# Patient Record
Sex: FEMALE | Race: WHITE
[De-identification: names, ages, dates, MRNs, and addresses within clinical notes are randomized per-mention and may not be internally consistent; named-entity substitution may affect disease eponyms.]

---

## 2021-03-05 ENCOUNTER — HOSPITAL ENCOUNTER (INPATIENT)
Dept: HOSPITAL 35 - ER | Age: 66
LOS: 2 days | Discharge: HOME | DRG: 694 | End: 2021-03-07
Attending: INTERNAL MEDICINE | Admitting: INTERNAL MEDICINE
Payer: COMMERCIAL

## 2021-03-05 VITALS — DIASTOLIC BLOOD PRESSURE: 97 MMHG | SYSTOLIC BLOOD PRESSURE: 147 MMHG

## 2021-03-05 VITALS — DIASTOLIC BLOOD PRESSURE: 97 MMHG | SYSTOLIC BLOOD PRESSURE: 153 MMHG

## 2021-03-05 VITALS — SYSTOLIC BLOOD PRESSURE: 153 MMHG | DIASTOLIC BLOOD PRESSURE: 97 MMHG

## 2021-03-05 VITALS — DIASTOLIC BLOOD PRESSURE: 83 MMHG | SYSTOLIC BLOOD PRESSURE: 137 MMHG

## 2021-03-05 VITALS — HEIGHT: 67 IN | WEIGHT: 125 LBS | BODY MASS INDEX: 19.62 KG/M2

## 2021-03-05 VITALS — SYSTOLIC BLOOD PRESSURE: 172 MMHG | DIASTOLIC BLOOD PRESSURE: 103 MMHG

## 2021-03-05 VITALS — SYSTOLIC BLOOD PRESSURE: 137 MMHG | DIASTOLIC BLOOD PRESSURE: 83 MMHG

## 2021-03-05 VITALS — DIASTOLIC BLOOD PRESSURE: 91 MMHG | SYSTOLIC BLOOD PRESSURE: 151 MMHG

## 2021-03-05 DIAGNOSIS — Z20.822: ICD-10-CM

## 2021-03-05 DIAGNOSIS — Z86.16: ICD-10-CM

## 2021-03-05 DIAGNOSIS — N30.80: ICD-10-CM

## 2021-03-05 DIAGNOSIS — N20.0: ICD-10-CM

## 2021-03-05 DIAGNOSIS — N13.0: Primary | ICD-10-CM

## 2021-03-05 DIAGNOSIS — D64.9: ICD-10-CM

## 2021-03-05 DIAGNOSIS — I10: ICD-10-CM

## 2021-03-05 DIAGNOSIS — Z28.21: ICD-10-CM

## 2021-03-05 DIAGNOSIS — E44.0: ICD-10-CM

## 2021-03-05 LAB
ALBUMIN SERPL-MCNC: 2.8 G/DL (ref 3.4–5)
ALT SERPL-CCNC: 10 U/L (ref 30–65)
AMYLASE SERPL-CCNC: 54 U/L (ref 25–115)
ANION GAP SERPL CALC-SCNC: 8 MMOL/L (ref 7–16)
AST SERPL-CCNC: 9 U/L (ref 15–37)
BASOPHILS NFR BLD AUTO: 0.5 % (ref 0–2)
BILIRUB DIRECT SERPL-MCNC: < 0.1 MG/DL
BILIRUB SERPL-MCNC: 0.3 MG/DL (ref 0.2–1)
BILIRUB UR-MCNC: NEGATIVE MG/DL
BUN SERPL-MCNC: 11 MG/DL (ref 7–18)
CALCIUM SERPL-MCNC: 9.3 MG/DL (ref 8.5–10.1)
CHLORIDE SERPL-SCNC: 102 MMOL/L (ref 98–107)
CO2 SERPL-SCNC: 28 MMOL/L (ref 21–32)
COLOR UR: YELLOW
CREAT SERPL-MCNC: 1 MG/DL (ref 0.6–1)
EOSINOPHIL NFR BLD: 0.6 % (ref 0–3)
ERYTHROCYTE [DISTWIDTH] IN BLOOD BY AUTOMATED COUNT: 14.9 % (ref 10.5–14.5)
FERRITIN SERPL-MCNC: 220 NG/ML (ref 8–252)
FOLATE SERPL-MCNC: 7 NG/ML (ref 8.6–58.9)
GLUCOSE SERPL-MCNC: 105 MG/DL (ref 74–106)
GRANULOCYTES NFR BLD MANUAL: 75.8 % (ref 36–66)
HCT VFR BLD CALC: 31.4 % (ref 37–47)
HGB BLD-MCNC: 10.2 GM/DL (ref 12–15)
IRON SERPL-MCNC: 21 UG/DL (ref 50–170)
KETONES UR STRIP-MCNC: NEGATIVE MG/DL
LIPASE: 86 U/L (ref 73–393)
LYMPHOCYTES NFR BLD AUTO: 14.9 % (ref 24–44)
MCH RBC QN AUTO: 25.9 PG (ref 26–34)
MCHC RBC AUTO-ENTMCNC: 32.4 G/DL (ref 28–37)
MCV RBC: 79.9 FL (ref 80–100)
MONOCYTES NFR BLD: 8.2 % (ref 1–8)
NEUTROPHILS # BLD: 6.3 THOU/UL (ref 1.4–8.2)
PLATELET # BLD: 601 THOU/UL (ref 150–400)
POTASSIUM SERPL-SCNC: 3.6 MMOL/L (ref 3.5–5.1)
PROT SERPL-MCNC: 7.5 G/DL (ref 6.4–8.2)
RBC # BLD AUTO: 3.93 MIL/UL (ref 4.2–5)
RBC # UR STRIP: (no result) /UL
SAO2 % BLD FROM PO2: 12 % (ref 20–39)
SODIUM SERPL-SCNC: 138 MMOL/L (ref 136–145)
SP GR UR STRIP: 1.02 (ref 1–1.03)
TIBC SERPL-MCNC: 177 UG/DL (ref 250–450)
TROPONIN I SERPL-MCNC: <0.06 NG/ML (ref ?–0.06)
URINE CLARITY: CLEAR
URINE GLUCOSE-RANDOM*: NEGATIVE
URINE LEUKOCYTES-REFLEX: (no result)
URINE NITRITE-REFLEX: NEGATIVE
URINE PROTEIN (DIPSTICK): NEGATIVE
UROBILINOGEN UR STRIP-ACNC: 1 E.U./DL (ref 0.2–1)
VIT B12 SERPL-MCNC: 560 PG/ML (ref 193–986)
WBC # BLD AUTO: 8.4 THOU/UL (ref 4–11)

## 2021-03-05 PROCEDURE — 10779: CPT

## 2021-03-05 NOTE — NUR
Pt. transfered to room [address] on [address].  Pt. pleasant and offers no c/o pain or
nausea.  Report given to Geeta DELUNA.

## 2021-03-05 NOTE — NUR
ATTEMPTED TO CALL REPORT. WAITED ON HOLD FOR SEVERAL MINUTES. ADVISED GABY DORMAN
TO TAKE PT. TO FLOOR, NURSE CAN CALL FOR REPORT AT HIS/HER CONVENIENCE

## 2021-03-05 NOTE — NUR
Lab had called with covid positive lab results.  Dr. Husain called and notified
and she wants patient transfered to [facility].  House supervisor informed and
awaiting room number.  Pt. was informed of lab results and the need to
transfer to [facility].

## 2021-03-05 NOTE — NUR
Assumed pt care from ED. Pt is alert & oriented x4 and cooperative
. Up & ad alex. No c/o of
pain, nausea and vomiting this pm. Pt has IV L AC. Denies smoke and drinking.
Endorse to night nurse. Pt is on the bed. Bed on the lowest position, side
rails up x 2, call light within reach. Will continue to monitor. Follow POC.

## 2021-03-06 VITALS — DIASTOLIC BLOOD PRESSURE: 112 MMHG | SYSTOLIC BLOOD PRESSURE: 165 MMHG

## 2021-03-06 VITALS — DIASTOLIC BLOOD PRESSURE: 103 MMHG | SYSTOLIC BLOOD PRESSURE: 162 MMHG

## 2021-03-06 VITALS — SYSTOLIC BLOOD PRESSURE: 166 MMHG | DIASTOLIC BLOOD PRESSURE: 115 MMHG

## 2021-03-06 VITALS — SYSTOLIC BLOOD PRESSURE: 150 MMHG | DIASTOLIC BLOOD PRESSURE: 109 MMHG

## 2021-03-06 VITALS — DIASTOLIC BLOOD PRESSURE: 104 MMHG | SYSTOLIC BLOOD PRESSURE: 151 MMHG

## 2021-03-06 LAB
ANION GAP SERPL CALC-SCNC: 9 MMOL/L (ref 7–16)
BASOPHILS NFR BLD AUTO: 0.5 % (ref 0–2)
BUN SERPL-MCNC: 7 MG/DL (ref 7–18)
CALCIUM SERPL-MCNC: 8.7 MG/DL (ref 8.5–10.1)
CHLORIDE SERPL-SCNC: 103 MMOL/L (ref 98–107)
CO2 SERPL-SCNC: 25 MMOL/L (ref 21–32)
CREAT SERPL-MCNC: 0.9 MG/DL (ref 0.6–1)
EOSINOPHIL NFR BLD: 1 % (ref 0–3)
ERYTHROCYTE [DISTWIDTH] IN BLOOD BY AUTOMATED COUNT: 14.7 % (ref 10.5–14.5)
GLUCOSE SERPL-MCNC: 109 MG/DL (ref 74–106)
GRANULOCYTES NFR BLD MANUAL: 78.3 % (ref 36–66)
HCT VFR BLD CALC: 29.4 % (ref 37–47)
HGB BLD-MCNC: 9.5 GM/DL (ref 12–15)
LYMPHOCYTES NFR BLD AUTO: 13.5 % (ref 24–44)
MAGNESIUM SERPL-MCNC: 1.7 MG/DL (ref 1.8–2.4)
MCH RBC QN AUTO: 25.8 PG (ref 26–34)
MCHC RBC AUTO-ENTMCNC: 32.4 G/DL (ref 28–37)
MCV RBC: 79.6 FL (ref 80–100)
MONOCYTES NFR BLD: 6.7 % (ref 1–8)
NEUTROPHILS # BLD: 6.2 THOU/UL (ref 1.4–8.2)
PLATELET # BLD: 535 THOU/UL (ref 150–400)
POTASSIUM SERPL-SCNC: 3.6 MMOL/L (ref 3.5–5.1)
RBC # BLD AUTO: 3.7 MIL/UL (ref 4.2–5)
SODIUM SERPL-SCNC: 137 MMOL/L (ref 136–145)
WBC # BLD AUTO: 7.9 THOU/UL (ref 4–11)

## 2021-03-06 NOTE — NUR
PT AWAKE RESTING IN BED WATCHING TV. IVF INTACT. PT INDEP STEADY GAIT. BP
REMAINS ELEVATED R/T RENAL OBSTRUCTION. PT STATED SHE WANTS TO LEAVE IN THE AM
AND RETURN FOR KIDNEY SCAN OUTPT. PT DECLINED REPEAT COVID SWAB ON DAY SHIFT.
PT DECLINING MEDICATIONS OTHER THAN IVF. PT DECLINED HS SNACK.

## 2021-03-06 NOTE — NUR
pt transferred to room 356. covid positive. pt is aware of NPO status tonight
for nuclear medicaine study in the am. denies abdominal pain at time of
transfer. she stated that she really has not eaten at all today and her pain
is much improved. oriented to room and surroundings. calm and cooperative.

## 2021-03-06 NOTE — NUR
PATIENT HAS RESTED IN ROOM THROUGH THE DAY. SHE STATES SHE WILL LIKE TO GO
HOME BUT  WILL PREFER SHE STAYED THROUGH THE NIGHT TO GET A REPEATE OF RENAL
TESTS IN AM. SHE IS PLEASANT. REFUSED TO HAVE SECOND COVID TEST DONE AS SHE
THINKS WILL NOT CHANGE HER OUTCOME.  AND FMILY HAS BEEN EXPOSED TO
COVID ALREADY.

## 2021-03-07 VITALS — DIASTOLIC BLOOD PRESSURE: 92 MMHG | SYSTOLIC BLOOD PRESSURE: 149 MMHG

## 2021-03-07 VITALS — SYSTOLIC BLOOD PRESSURE: 149 MMHG | DIASTOLIC BLOOD PRESSURE: 92 MMHG

## 2021-03-07 VITALS — DIASTOLIC BLOOD PRESSURE: 102 MMHG | SYSTOLIC BLOOD PRESSURE: 152 MMHG

## 2021-03-07 LAB
ANION GAP SERPL CALC-SCNC: 11 MMOL/L (ref 7–16)
BUN SERPL-MCNC: 5 MG/DL (ref 7–18)
CALCIUM SERPL-MCNC: 8.8 MG/DL (ref 8.5–10.1)
CHLORIDE SERPL-SCNC: 106 MMOL/L (ref 98–107)
CO2 SERPL-SCNC: 24 MMOL/L (ref 21–32)
CREAT SERPL-MCNC: 0.8 MG/DL (ref 0.6–1)
ERYTHROCYTE [DISTWIDTH] IN BLOOD BY AUTOMATED COUNT: 15.2 % (ref 10.5–14.5)
GLUCOSE SERPL-MCNC: 100 MG/DL (ref 74–106)
HCT VFR BLD CALC: 30.2 % (ref 37–47)
HGB BLD-MCNC: 9.8 GM/DL (ref 12–15)
MCH RBC QN AUTO: 26 PG (ref 26–34)
MCHC RBC AUTO-ENTMCNC: 32.5 G/DL (ref 28–37)
MCV RBC: 80 FL (ref 80–100)
PLATELET # BLD: 528 THOU/UL (ref 150–400)
POTASSIUM SERPL-SCNC: 3.4 MMOL/L (ref 3.5–5.1)
RBC # BLD AUTO: 3.77 MIL/UL (ref 4.2–5)
SODIUM SERPL-SCNC: 141 MMOL/L (ref 136–145)
WBC # BLD AUTO: 7.1 THOU/UL (ref 4–11)

## 2021-03-07 NOTE — NUR
APPOINTMENT FOR OUTPATIENT RENAL SCAN SET UP FOR PT, PT INSTRUCTED ON TP
CALL NUCLEAR MED EARLY IN THE AM TO SET UP TIME FOR SCAN. NUMBER TO SET APPT
WITH UROLOGY IS GIVEN TO PT. NEW MED INFO AND D/C INSTRUCTION GIVEN TO PT. IV
D/C. PT DENIES ANY QUESTIONS. PT TAKEN DOWN VIA WHEELCHAIR.

## 2021-03-08 NOTE — EKG
[address]  [address]
Phone:  [phone]                    ELECTROCARDIOGRAM REPORT      
_______________________________________________________________________________
 
Name:       RHONDA TORREZ               Room #:         356-P       [facility] [place]  
M.R.#:      [id number]     Account #:      [id number]  
Admission:  21    Attend Phys:    Gopi Esteves MD      
Discharge:  21    Date of Birth:  [birth year]  
                                                          Report #: [id number]
   [id number]
_______________________________________________________________________________
                         [facility] ED
                                       
Test Date:    2021               Test Time:    14:54:47
Pat Name:     RHONDA TORREZ          Department:   
Patient ID:   [id number]            Room:         356
Gender:       F                        Technician:   SHIVANI
:          1955               Requested By: Juan Carlos Decker
Order Number: [id number] MD:   Korey Ernandez
                                 Measurements
Intervals                              Axis          
Rate:         74                       P:            72
FL:           139                      QRS:          45
QRSD:         94                       T:            45
QT:           435                                    
QTc:          483                                    
                           Interpretive Statements
Sinus rhythm
Atrial premature complex
Probable left atrial enlargement
No previous ECG available for comparison
Electronically Signed On 3-8-2021 7:35:30 CST by Korey Ernandez
[email or web address]
 
 
 
 
 
 
 
 
 
 
 
 
 
 
 
 
 
 
 
 
 
  <ELECTRONICALLY SIGNED>
   By: Korey Ernandez MD, [facility]    
  21     0735
D: 21 1454                           _____________________________________
T: 21 1454                           Korey Ernandez MD, FACC      /EPI

## 2021-04-07 ENCOUNTER — HOSPITAL ENCOUNTER (OUTPATIENT)
Dept: HOSPITAL 35 - NUC | Age: 66
End: 2021-04-07
Attending: INTERNAL MEDICINE
Payer: COMMERCIAL

## 2021-04-07 DIAGNOSIS — N13.0: ICD-10-CM

## 2021-04-07 DIAGNOSIS — N28.89: Primary | ICD-10-CM
